# Patient Record
Sex: FEMALE | Race: OTHER | HISPANIC OR LATINO
[De-identification: names, ages, dates, MRNs, and addresses within clinical notes are randomized per-mention and may not be internally consistent; named-entity substitution may affect disease eponyms.]

---

## 2017-01-26 ENCOUNTER — APPOINTMENT (OUTPATIENT)
Dept: CARDIOLOGY | Facility: CLINIC | Age: 47
End: 2017-01-26

## 2017-01-26 ENCOUNTER — NON-APPOINTMENT (OUTPATIENT)
Age: 47
End: 2017-01-26

## 2017-01-26 VITALS
SYSTOLIC BLOOD PRESSURE: 111 MMHG | HEIGHT: 62 IN | OXYGEN SATURATION: 95 % | DIASTOLIC BLOOD PRESSURE: 70 MMHG | WEIGHT: 114 LBS | RESPIRATION RATE: 12 BRPM | HEART RATE: 97 BPM | BODY MASS INDEX: 20.98 KG/M2 | TEMPERATURE: 98.7 F

## 2017-01-26 DIAGNOSIS — R06.02 SHORTNESS OF BREATH: ICD-10-CM

## 2017-01-26 DIAGNOSIS — R60.0 LOCALIZED EDEMA: ICD-10-CM

## 2017-01-26 DIAGNOSIS — E11.9 TYPE 2 DIABETES MELLITUS W/OUT COMPLICATIONS: ICD-10-CM

## 2017-01-26 RX ORDER — SITAGLIPTIN AND METFORMIN HYDROCHLORIDE 50; 1000 MG/1; MG/1
50-1000 TABLET, FILM COATED ORAL TWICE DAILY
Qty: 30 | Refills: 5 | Status: ACTIVE | COMMUNITY
Start: 2017-01-26

## 2017-02-08 ENCOUNTER — APPOINTMENT (OUTPATIENT)
Dept: CARDIOLOGY | Facility: CLINIC | Age: 47
End: 2017-02-08

## 2017-02-16 LAB
HBV SURFACE AB SER QL: NONREACTIVE
MEV IGG FLD QL IA: >300 AU/ML
MEV IGG+IGM SER-IMP: POSITIVE
MUV AB SER-ACNC: POSITIVE
MUV IGG SER QL IA: 82.7 AU/ML
RUBV IGG FLD-ACNC: 2.4 INDEX
RUBV IGG SER-IMP: POSITIVE
VZV AB TITR SER: POSITIVE
VZV IGG SER IF-ACNC: 276.6 INDEX

## 2017-02-17 LAB
ADJUSTED MITOGEN: >10 IU/ML
ADJUSTED TB AG: 0 IU/ML
M TB IFN-G BLD-IMP: NEGATIVE
QUANTIFERON GOLD NIL: 0.04 IU/ML

## 2017-02-21 LAB
AMPHET UR-MCNC: NEGATIVE
BARBITURATES UR-MCNC: NEGATIVE
BENZODIAZ UR-MCNC: NEGATIVE
COCAINE METAB.OTHER UR-MCNC: NEGATIVE
CREATININE, URINE: 29.7 MG/DL
METHADONE UR-MCNC: NEGATIVE
METHAQUALONE UR-MCNC: NEGATIVE
OPIATES UR-MCNC: NEGATIVE
PCP UR-MCNC: NEGATIVE
PROPOXYPH UR QL: NEGATIVE
THC UR QL: NEGATIVE

## 2017-04-27 ENCOUNTER — APPOINTMENT (OUTPATIENT)
Dept: INTERNAL MEDICINE | Facility: CLINIC | Age: 47
End: 2017-04-27

## 2019-08-22 ENCOUNTER — APPOINTMENT (OUTPATIENT)
Dept: ORTHOPEDIC SURGERY | Facility: CLINIC | Age: 49
End: 2019-08-22
Payer: COMMERCIAL

## 2019-08-22 PROCEDURE — 73562 X-RAY EXAM OF KNEE 3: CPT | Mod: 50

## 2019-08-22 PROCEDURE — 20611 DRAIN/INJ JOINT/BURSA W/US: CPT | Mod: RT

## 2019-08-22 PROCEDURE — 99204 OFFICE O/P NEW MOD 45 MIN: CPT | Mod: 25

## 2019-08-22 RX ORDER — GABAPENTIN 400 MG/1
400 CAPSULE ORAL
Qty: 90 | Refills: 0 | Status: ACTIVE | COMMUNITY
Start: 2019-05-31

## 2019-08-22 RX ORDER — FAMOTIDINE 20 MG/1
20 TABLET, FILM COATED ORAL
Qty: 60 | Refills: 0 | Status: ACTIVE | COMMUNITY
Start: 2019-02-22

## 2019-08-22 RX ORDER — LOSARTAN POTASSIUM 25 MG/1
25 TABLET, FILM COATED ORAL
Qty: 30 | Refills: 0 | Status: ACTIVE | COMMUNITY
Start: 2019-04-18

## 2019-08-22 RX ORDER — INSULIN GLARGINE 100 [IU]/ML
100 INJECTION, SOLUTION SUBCUTANEOUS
Qty: 15 | Refills: 0 | Status: ACTIVE | COMMUNITY
Start: 2019-08-22

## 2019-08-22 RX ORDER — PEN NEEDLE, DIABETIC 29 G X1/2"
31G X 8 MM NEEDLE, DISPOSABLE MISCELLANEOUS
Qty: 100 | Refills: 0 | Status: ACTIVE | COMMUNITY
Start: 2019-08-22

## 2019-08-22 RX ORDER — PIOGLITAZONE HYDROCHLORIDE 30 MG/1
30 TABLET ORAL
Qty: 60 | Refills: 0 | Status: ACTIVE | COMMUNITY
Start: 2019-08-21

## 2019-08-22 RX ORDER — PIOGLITAZONE HYDROCHLORIDE 45 MG/1
45 TABLET ORAL
Qty: 90 | Refills: 0 | Status: ACTIVE | COMMUNITY
Start: 2019-08-22

## 2019-08-22 NOTE — HISTORY OF PRESENT ILLNESS
[de-identified] : RIGHT KNEE PAIN - SWELLING - USING STEPS \par \par SENT FOR NEW XRAYS Patient has bilateral knee pain right is more severe it started chronically than 2 years she recalls no specific accident or injury.\par \par Patient has been taking anti-inflammatories as needed she is here for first visit

## 2019-08-22 NOTE — PROCEDURE
[de-identified] : H. was given a right knee cortisone injection today this is done sterile conditions an ultrasound guidance. Patient tolerated the procedure well.

## 2019-08-22 NOTE — PHYSICAL EXAM
[de-identified] : Right knee definite medial joint line warmth and tenderness is mild valgus inclination range of motion is 5-125°. No effusion today on exam.\par \par Left knee also has definite medial joint line tenderness but less warmth less varus examination range of motion 0-125°. [de-identified] : AP standing individual lateral and sunrise views were obtained showing fairly advanced medial compartment degenerative changes in both knees more advanced than the right.

## 2019-08-22 NOTE — DISCUSSION/SUMMARY
[de-identified] : Patient and her  were told that she will probably require knee replacement surgery in the next year or so. Patient would like to consider conservative treatment we will order Orthovisc injections for both knees patient will be notified if there are available for use.

## 2019-09-19 ENCOUNTER — RX RENEWAL (OUTPATIENT)
Age: 49
End: 2019-09-19

## 2019-09-19 RX ORDER — HYALURONATE SODIUM 30 MG/2 ML
30 SYRINGE (ML) INTRAARTICULAR
Qty: 6 | Refills: 0 | Status: ACTIVE | OUTPATIENT
Start: 2019-09-19

## 2019-12-09 ENCOUNTER — OTHER (OUTPATIENT)
Age: 49
End: 2019-12-09

## 2019-12-10 ENCOUNTER — RX RENEWAL (OUTPATIENT)
Age: 49
End: 2019-12-10

## 2019-12-10 ENCOUNTER — OTHER (OUTPATIENT)
Age: 49
End: 2019-12-10

## 2019-12-10 DIAGNOSIS — M17.0 BILATERAL PRIMARY OSTEOARTHRITIS OF KNEE: ICD-10-CM

## 2019-12-10 RX ORDER — HYALURONATE SOD, CROSS-LINKED 30 MG/3 ML
30 SYRINGE (ML) INTRAARTICULAR
Qty: 2 | Refills: 0 | Status: ACTIVE | OUTPATIENT
Start: 2019-12-10

## 2020-03-05 ENCOUNTER — APPOINTMENT (OUTPATIENT)
Dept: ORTHOPEDIC SURGERY | Facility: CLINIC | Age: 50
End: 2020-03-05

## 2020-03-12 ENCOUNTER — APPOINTMENT (OUTPATIENT)
Dept: ORTHOPEDIC SURGERY | Facility: CLINIC | Age: 50
End: 2020-03-12
Payer: COMMERCIAL

## 2020-03-12 PROCEDURE — 20611 DRAIN/INJ JOINT/BURSA W/US: CPT | Mod: RT

## 2020-03-12 PROCEDURE — 99214 OFFICE O/P EST MOD 30 MIN: CPT | Mod: 25

## 2020-03-12 NOTE — PROCEDURE
[de-identified] : H. was given a right knee cortisone injection today this is done sterile conditions an ultrasound guidance. Patient tolerated the procedure well.

## 2020-03-12 NOTE — HISTORY OF PRESENT ILLNESS
[de-identified] : Patient is here with increasing symptoms of right knee arthritis. This the diagnoses well known for her and she is a chronic condition we've treated her for the past we had recently ordered Orthovisc injections which issued today requesting cortisone injection as a temporizing measure.

## 2020-03-12 NOTE — PHYSICAL EXAM
[de-identified] : Right knee definite medial joint line warmth and tenderness is mild valgus inclination range of motion is 5-125°. No effusion today on exam.\par \par .

## 2020-03-26 ENCOUNTER — APPOINTMENT (OUTPATIENT)
Dept: ORTHOPEDIC SURGERY | Facility: CLINIC | Age: 50
End: 2020-03-26

## 2021-01-28 ENCOUNTER — APPOINTMENT (OUTPATIENT)
Dept: ORTHOPEDIC SURGERY | Facility: CLINIC | Age: 51
End: 2021-01-28
Payer: COMMERCIAL

## 2021-01-28 PROCEDURE — 99072 ADDL SUPL MATRL&STAF TM PHE: CPT

## 2021-01-28 PROCEDURE — 99214 OFFICE O/P EST MOD 30 MIN: CPT | Mod: 25

## 2021-01-28 PROCEDURE — 20611 DRAIN/INJ JOINT/BURSA W/US: CPT | Mod: RT

## 2021-01-28 NOTE — PHYSICAL EXAM
[de-identified] : Right knee definite medial joint line warmth and tenderness is mild valgus inclination range of motion is 5-125°. No effusion today on exam.

## 2021-01-28 NOTE — PROCEDURE
[de-identified] : Injection 1/1\par Patient was given an GEL ONE the lateral compartment of the knee. Injection is performed under sterile conditions with ultrasound guidance. Patient tolerated procedure well. \par  \par  \par \par

## 2021-07-15 ENCOUNTER — APPOINTMENT (OUTPATIENT)
Dept: ORTHOPEDIC SURGERY | Facility: CLINIC | Age: 51
End: 2021-07-15

## 2021-07-22 ENCOUNTER — APPOINTMENT (OUTPATIENT)
Dept: ORTHOPEDIC SURGERY | Facility: CLINIC | Age: 51
End: 2021-07-22
Payer: COMMERCIAL

## 2021-07-22 PROCEDURE — 99214 OFFICE O/P EST MOD 30 MIN: CPT

## 2021-07-22 PROCEDURE — 99072 ADDL SUPL MATRL&STAF TM PHE: CPT

## 2021-07-22 NOTE — DISCUSSION/SUMMARY
[Medication Risks Reviewed] : Medication risks reviewed [de-identified] : We talked at length about our options patient states that she would like to try medication as a first step we have prescribed her Celebrex 200 mg p.o. b.i.d. for 6 day course and be taken thereafter as needed. Patient will follow up in 2 weeks if not improved at that point may consider a cortisone injection and possible re re ordering of her Orthovisc injections.

## 2021-07-22 NOTE — PHYSICAL EXAM
[de-identified] : Right knee definite medial joint line warmth and tenderness is mild valgus inclination range of motion is 5-125°. No effusion today on exam.\par \par . [de-identified] : AP standing individual and sunrise views are obtained showing bone on bone contact the medial aspect of the right knee and standing AP projection.

## 2021-07-22 NOTE — HISTORY OF PRESENT ILLNESS
[de-identified] : The patient returns today she has resumption of mild to moderate medial right knee pain. She's been treated in the past with cortisone injections and gel injections. Patient states her pain is moderate really noticed it mostly only when going up and down stairs and squatting. She indicates the medial aspect of the right

## 2021-07-22 NOTE — REASON FOR VISIT
[Follow-Up Visit] : a follow-up visit for [FreeTextEntry2] : RIGHT KNEE CONTINUED PAIN - POSSIBLY ORDER GEL AND CORTISONE TODAY

## 2021-08-05 ENCOUNTER — APPOINTMENT (OUTPATIENT)
Dept: ORTHOPEDIC SURGERY | Facility: CLINIC | Age: 51
End: 2021-08-05

## 2022-09-20 ENCOUNTER — APPOINTMENT (OUTPATIENT)
Dept: ORTHOPEDIC SURGERY | Facility: CLINIC | Age: 52
End: 2022-09-20